# Patient Record
Sex: FEMALE | Race: OTHER | URBAN - METROPOLITAN AREA
[De-identification: names, ages, dates, MRNs, and addresses within clinical notes are randomized per-mention and may not be internally consistent; named-entity substitution may affect disease eponyms.]

---

## 2022-02-05 ENCOUNTER — OFFICE VISIT (OUTPATIENT)
Dept: INTERNAL MEDICINE CLINIC | Facility: CLINIC | Age: 42
End: 2022-02-05
Payer: COMMERCIAL

## 2022-02-05 VITALS
WEIGHT: 180 LBS | OXYGEN SATURATION: 99 % | BODY MASS INDEX: 30.73 KG/M2 | HEART RATE: 84 BPM | SYSTOLIC BLOOD PRESSURE: 120 MMHG | DIASTOLIC BLOOD PRESSURE: 78 MMHG | HEIGHT: 64 IN

## 2022-02-05 DIAGNOSIS — Z12.31 ENCOUNTER FOR SCREENING MAMMOGRAM FOR MALIGNANT NEOPLASM OF BREAST: ICD-10-CM

## 2022-02-05 DIAGNOSIS — N92.6 ABNORMAL MENSTRUAL CYCLE: ICD-10-CM

## 2022-02-05 DIAGNOSIS — Z13.220 SCREENING FOR LIPID DISORDERS: ICD-10-CM

## 2022-02-05 DIAGNOSIS — Z83.49 FAMILY HISTORY OF THYROID DISEASE: ICD-10-CM

## 2022-02-05 DIAGNOSIS — Z00.00 PREVENTATIVE HEALTH CARE: Primary | ICD-10-CM

## 2022-02-05 DIAGNOSIS — Z13.1 SCREENING FOR DIABETES MELLITUS: ICD-10-CM

## 2022-02-05 PROBLEM — N87.9 CERVICAL DYSPLASIA: Status: ACTIVE | Noted: 2020-07-15

## 2022-02-05 PROBLEM — O02.1 MISSED ABORTION: Status: ACTIVE | Noted: 2020-07-15

## 2022-02-05 PROBLEM — O02.1 MISSED ABORTION (HCC): Status: ACTIVE | Noted: 2020-07-15

## 2022-02-05 LAB
ALBUMIN SERPL-MCNC: 4 G/DL (ref 3.4–5)
ALBUMIN/GLOB SERPL: 0.9 {RATIO} (ref 1–2)
ALP LIVER SERPL-CCNC: 85 U/L
ALT SERPL-CCNC: 32 U/L
ANION GAP SERPL CALC-SCNC: 6 MMOL/L (ref 0–18)
AST SERPL-CCNC: 21 U/L (ref 15–37)
BILIRUB SERPL-MCNC: 0.6 MG/DL (ref 0.1–2)
BUN BLD-MCNC: 10 MG/DL (ref 7–18)
BUN/CREAT SERPL: 10.2 (ref 10–20)
CALCIUM BLD-MCNC: 9.1 MG/DL (ref 8.5–10.1)
CHLORIDE SERPL-SCNC: 105 MMOL/L (ref 98–112)
CHOLEST SERPL-MCNC: 217 MG/DL (ref ?–200)
CO2 SERPL-SCNC: 26 MMOL/L (ref 21–32)
CREAT BLD-MCNC: 0.98 MG/DL
EST. AVERAGE GLUCOSE BLD GHB EST-MCNC: 128 MG/DL (ref 68–126)
FASTING PATIENT LIPID ANSWER: YES
FASTING STATUS PATIENT QL REPORTED: YES
GLOBULIN PLAS-MCNC: 4.7 G/DL (ref 2.8–4.4)
GLUCOSE BLD-MCNC: 102 MG/DL (ref 70–99)
HBA1C MFR BLD: 6.1 % (ref ?–5.7)
HDLC SERPL-MCNC: 52 MG/DL (ref 40–59)
LDLC SERPL CALC-MCNC: 141 MG/DL (ref ?–100)
NONHDLC SERPL-MCNC: 165 MG/DL (ref ?–130)
OSMOLALITY SERPL CALC.SUM OF ELEC: 283 MOSM/KG (ref 275–295)
POTASSIUM SERPL-SCNC: 4.8 MMOL/L (ref 3.5–5.1)
PROT SERPL-MCNC: 8.7 G/DL (ref 6.4–8.2)
SODIUM SERPL-SCNC: 137 MMOL/L (ref 136–145)
TRIGL SERPL-MCNC: 134 MG/DL (ref 30–149)
TSI SER-ACNC: 1.36 MIU/ML (ref 0.36–3.74)
VLDLC SERPL CALC-MCNC: 25 MG/DL (ref 0–30)

## 2022-02-05 PROCEDURE — 99386 PREV VISIT NEW AGE 40-64: CPT | Performed by: FAMILY MEDICINE

## 2022-02-05 PROCEDURE — 3078F DIAST BP <80 MM HG: CPT | Performed by: FAMILY MEDICINE

## 2022-02-05 PROCEDURE — 84443 ASSAY THYROID STIM HORMONE: CPT | Performed by: FAMILY MEDICINE

## 2022-02-05 PROCEDURE — 3074F SYST BP LT 130 MM HG: CPT | Performed by: FAMILY MEDICINE

## 2022-02-05 PROCEDURE — 83036 HEMOGLOBIN GLYCOSYLATED A1C: CPT | Performed by: FAMILY MEDICINE

## 2022-02-05 PROCEDURE — 80053 COMPREHEN METABOLIC PANEL: CPT | Performed by: FAMILY MEDICINE

## 2022-02-05 PROCEDURE — 3008F BODY MASS INDEX DOCD: CPT | Performed by: FAMILY MEDICINE

## 2022-02-05 PROCEDURE — 80061 LIPID PANEL: CPT | Performed by: FAMILY MEDICINE

## 2022-11-21 ENCOUNTER — OFFICE VISIT (OUTPATIENT)
Dept: OBGYN CLINIC | Facility: CLINIC | Age: 42
End: 2022-11-21
Payer: COMMERCIAL

## 2022-11-21 VITALS
WEIGHT: 171.63 LBS | SYSTOLIC BLOOD PRESSURE: 117 MMHG | BODY MASS INDEX: 29 KG/M2 | DIASTOLIC BLOOD PRESSURE: 79 MMHG | HEART RATE: 84 BPM

## 2022-11-21 DIAGNOSIS — Z12.4 SCREENING FOR MALIGNANT NEOPLASM OF CERVIX: ICD-10-CM

## 2022-11-21 DIAGNOSIS — Z01.419 WELL WOMAN EXAM WITH ROUTINE GYNECOLOGICAL EXAM: Primary | ICD-10-CM

## 2022-11-21 DIAGNOSIS — N39.3 STRESS INCONTINENCE: ICD-10-CM

## 2022-11-21 PROCEDURE — 99386 PREV VISIT NEW AGE 40-64: CPT | Performed by: NURSE PRACTITIONER

## 2022-11-21 PROCEDURE — 3074F SYST BP LT 130 MM HG: CPT | Performed by: NURSE PRACTITIONER

## 2022-11-21 PROCEDURE — 3078F DIAST BP <80 MM HG: CPT | Performed by: NURSE PRACTITIONER

## 2022-11-22 LAB — HPV I/H RISK 1 DNA SPEC QL NAA+PROBE: NEGATIVE

## 2023-01-03 ENCOUNTER — HOSPITAL ENCOUNTER (OUTPATIENT)
Dept: MAMMOGRAPHY | Age: 43
Discharge: HOME | End: 2023-01-03
Attending: FAMILY MEDICINE
Payer: COMMERCIAL

## 2023-01-03 DIAGNOSIS — Z12.31 ENCOUNTER FOR SCREENING MAMMOGRAM FOR MALIGNANT NEOPLASM OF BREAST: ICD-10-CM

## 2023-01-03 PROCEDURE — 77063 BREAST TOMOSYNTHESIS BI: CPT | Performed by: FAMILY MEDICINE

## 2023-01-03 PROCEDURE — 77067 SCR MAMMO BI INCL CAD: CPT | Performed by: FAMILY MEDICINE

## 2023-12-04 ENCOUNTER — E-VISIT (OUTPATIENT)
Dept: TELEHEALTH | Age: 43
End: 2023-12-04
Payer: COMMERCIAL

## 2023-12-04 DIAGNOSIS — R05.1 ACUTE COUGH: Primary | ICD-10-CM

## 2023-12-04 PROCEDURE — 99422 OL DIG E/M SVC 11-20 MIN: CPT | Performed by: PHYSICIAN ASSISTANT

## 2023-12-05 RX ORDER — BENZONATATE 200 MG/1
200 CAPSULE ORAL 3 TIMES DAILY PRN
Qty: 30 CAPSULE | Refills: 0 | Status: SHIPPED | OUTPATIENT
Start: 2023-12-05

## 2023-12-05 RX ORDER — ALBUTEROL SULFATE 90 UG/1
2 AEROSOL, METERED RESPIRATORY (INHALATION) EVERY 6 HOURS
Qty: 1 EACH | Refills: 0 | Status: SHIPPED | OUTPATIENT
Start: 2023-12-05

## 2023-12-29 ENCOUNTER — OFFICE VISIT (OUTPATIENT)
Dept: OBGYN CLINIC | Facility: CLINIC | Age: 43
End: 2023-12-29
Payer: COMMERCIAL

## 2023-12-29 VITALS
SYSTOLIC BLOOD PRESSURE: 119 MMHG | HEART RATE: 80 BPM | DIASTOLIC BLOOD PRESSURE: 73 MMHG | BODY MASS INDEX: 30 KG/M2 | WEIGHT: 177.19 LBS

## 2023-12-29 DIAGNOSIS — Z12.31 SCREENING MAMMOGRAM FOR BREAST CANCER: ICD-10-CM

## 2023-12-29 DIAGNOSIS — Z01.419 WELL WOMAN EXAM WITH ROUTINE GYNECOLOGICAL EXAM: Primary | ICD-10-CM

## 2023-12-29 PROCEDURE — 3074F SYST BP LT 130 MM HG: CPT | Performed by: NURSE PRACTITIONER

## 2023-12-29 PROCEDURE — 3078F DIAST BP <80 MM HG: CPT | Performed by: NURSE PRACTITIONER

## 2023-12-29 PROCEDURE — 99396 PREV VISIT EST AGE 40-64: CPT | Performed by: NURSE PRACTITIONER

## 2024-03-09 ENCOUNTER — HOSPITAL ENCOUNTER (OUTPATIENT)
Dept: MAMMOGRAPHY | Age: 44
Discharge: HOME | End: 2024-03-09
Attending: NURSE PRACTITIONER
Payer: COMMERCIAL

## 2024-03-09 DIAGNOSIS — Z12.31 SCREENING MAMMOGRAM FOR BREAST CANCER: ICD-10-CM

## 2024-03-09 PROCEDURE — 77067 SCR MAMMO BI INCL CAD: CPT | Performed by: NURSE PRACTITIONER

## 2024-03-09 PROCEDURE — 77063 BREAST TOMOSYNTHESIS BI: CPT | Performed by: NURSE PRACTITIONER

## 2024-03-29 ENCOUNTER — TELEMEDICINE (OUTPATIENT)
Dept: FAMILY MEDICINE CLINIC | Facility: CLINIC | Age: 44
End: 2024-03-29
Payer: COMMERCIAL

## 2024-03-29 DIAGNOSIS — L70.0 ACNE VULGARIS: ICD-10-CM

## 2024-03-29 DIAGNOSIS — Z76.89 ENCOUNTER TO ESTABLISH CARE: Primary | ICD-10-CM

## 2024-03-29 DIAGNOSIS — L72.0 EPIDERMOID CYST: ICD-10-CM

## 2024-03-29 PROCEDURE — 99213 OFFICE O/P EST LOW 20 MIN: CPT

## 2024-03-29 NOTE — PROGRESS NOTES
Virtual Virtual Check-In    Kena Minaya verbally consents to a Virtual Video Check-In service on 24. Patient understands and accepts financial responsibility for any deductible, co-insurance and/or co-pays associated with this service. This visit is conducted using Telemedicine with live, interactive video and audio.     I spoke with Kena Minaya by secure video chat, verified date of birth, and discussed their current concerns:   Duration: 15 minutes     HPI  Kena Minaya presented via video visit as a new patient.  Complaint of cyst to the left jawline.  Nontender.  No drainage.  No fever.  Typically occurs each cycle.  Interested in treatment.  Has not tried any over-the-counter.  No acne treatment at this time.    Review of Systems:   Review of Systems   Constitutional:  Negative for activity change.   Respiratory:  Negative for chest tightness and shortness of breath.    Cardiovascular:  Negative for chest pain and palpitations.   Neurological: Negative.    Psychiatric/Behavioral: Negative.     All other systems reviewed and are negative.       Reviewed Active Problems:  Patient Active Problem List    Diagnosis    Cervical dysplasia     Formatting of this note might be different from the original.  Lucero Davenport A:when in 20\"s      Missed  (HCC)     Formatting of this note might be different from the original.  Lucero Davenport A:x 2   without anesthesia        Reviewed Past Medical History:  Past Medical History:   Diagnosis Date    Cancer (HCC)     cervical age 21    Gestational diabetes (HCC)       Reviewed Family History:  Family History   Problem Relation Age of Onset    Cancer Self         cervical age 21    Thyroid disease Mother         thyroid removed    Diabetes Father     Arthritis Father     Skin cancer Maternal Uncle        Reviewed Social History:  Social History     Socioeconomic History    Marital status:    Tobacco Use    Smoking status: Never    Smokeless tobacco:  Never   Substance and Sexual Activity    Alcohol use: Yes     Comment: 2 times a week    Drug use: Never    Sexual activity: Yes     Partners: Male   Social History Narrative    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       Reviewed Current Medications:  Current Outpatient Medications   Medication Sig Dispense Refill    benzonatate 200 MG Oral Cap Take 1 capsule (200 mg total) by mouth 3 (three) times daily as needed for cough. (Patient not taking: Reported on 12/29/2023) 30 capsule 0    albuterol 108 (90 Base) MCG/ACT Inhalation Aero Soln Inhale 2 puffs into the lungs every 6 (six) hours. (Patient not taking: Reported on 12/29/2023) 1 each 0          Physical Exam  There were no vitals filed for this visit.  Physical Exam  Vitals reviewed.   Constitutional:       Appearance: Normal appearance.   HENT:      Head: Normocephalic and atraumatic.   Cardiovascular:      Rate and Rhythm: Normal rate and regular rhythm.      Pulses: Normal pulses.      Heart sounds: Normal heart sounds.   Pulmonary:      Effort: Pulmonary effort is normal.      Breath sounds: Normal breath sounds.   Neurological:      General: No focal deficit present.      Mental Status: She is alert and oriented to person, place, and time.   Psychiatric:         Mood and Affect: Mood normal.         Behavior: Behavior normal.            Diagnosis:  (Z76.89) Encounter to establish care  (primary encounter diagnosis)  (L72.0) Epidermoid cyst  (L70.0) Acne vulgaris  Plan: Derm Referral - In Network       Patient with likely epidermoid cyst to the left jawline.  States increased in size.  Has not improved.  Typically flares during cycles.  Advise referral to dermatology for possible corticosteroid injection versus alternative treatment.  Referral placed.  Advise schedule.  Further management pending results.    Follow-up as needed.  Due for annual physical exam.  Will schedule.    Please note that the following visit was completed using  two-way, real-time interactive audio and/or video communication.  This has been done in good tanya to provide continuity of care in the best interest of the provider-patient relationship, due to the ongoing public health crisis/national emergency and because of restrictions of visitation.  There are limitations of this visit as no physical exam could be performed.  Every conscious effort was taken to allow for sufficient and adequate time.  This billing was spent on reviewing labs, medications, radiology tests and decision making.  Appropriate medical decision-making and tests are ordered as detailed in the plan of care above. Kena Minaya understands video evaluation is not a substitute for in person examination or emergency care. Patient advised to go to ER or call 911 for worsening symptoms or acute distress.     This note was prepared using Dragon Medical voice recognition dictation software. As a result errors may occur. When identified these errors have been corrected. While every attempt is made to correct errors during dictation discrepancies may still exist.  JAYMIE Owens

## 2024-04-08 ENCOUNTER — LAB ENCOUNTER (OUTPATIENT)
Dept: LAB | Age: 44
End: 2024-04-08
Payer: COMMERCIAL

## 2024-04-08 ENCOUNTER — OFFICE VISIT (OUTPATIENT)
Dept: FAMILY MEDICINE CLINIC | Facility: CLINIC | Age: 44
End: 2024-04-08
Payer: COMMERCIAL

## 2024-04-08 VITALS
TEMPERATURE: 98 F | BODY MASS INDEX: 31.24 KG/M2 | SYSTOLIC BLOOD PRESSURE: 110 MMHG | HEIGHT: 64 IN | WEIGHT: 183 LBS | HEART RATE: 66 BPM | OXYGEN SATURATION: 98 % | DIASTOLIC BLOOD PRESSURE: 64 MMHG

## 2024-04-08 DIAGNOSIS — Z00.00 WELL WOMAN EXAM WITHOUT GYNECOLOGICAL EXAM: Primary | ICD-10-CM

## 2024-04-08 DIAGNOSIS — L72.0 EPIDERMOID CYST: ICD-10-CM

## 2024-04-08 PROBLEM — O02.1 MISSED ABORTION (HCC): Status: RESOLVED | Noted: 2020-07-15 | Resolved: 2024-04-08

## 2024-04-08 PROBLEM — N87.9 CERVICAL DYSPLASIA: Status: RESOLVED | Noted: 2020-07-15 | Resolved: 2024-04-08

## 2024-04-08 LAB
ALBUMIN SERPL-MCNC: 4.5 G/DL (ref 3.2–4.8)
ALBUMIN/GLOB SERPL: 1.3 {RATIO} (ref 1–2)
ALP LIVER SERPL-CCNC: 87 U/L
ALT SERPL-CCNC: 21 U/L
ANION GAP SERPL CALC-SCNC: 8 MMOL/L (ref 0–18)
AST SERPL-CCNC: 22 U/L (ref ?–34)
BASOPHILS # BLD AUTO: 0.08 X10(3) UL (ref 0–0.2)
BASOPHILS NFR BLD AUTO: 0.6 %
BILIRUB SERPL-MCNC: 0.5 MG/DL (ref 0.3–1.2)
BUN BLD-MCNC: 12 MG/DL (ref 9–23)
BUN/CREAT SERPL: 14.3 (ref 10–20)
CALCIUM BLD-MCNC: 9.6 MG/DL (ref 8.7–10.4)
CHLORIDE SERPL-SCNC: 108 MMOL/L (ref 98–112)
CHOLEST SERPL-MCNC: 211 MG/DL (ref ?–200)
CO2 SERPL-SCNC: 24 MMOL/L (ref 21–32)
CREAT BLD-MCNC: 0.84 MG/DL
DEPRECATED RDW RBC AUTO: 48.3 FL (ref 35.1–46.3)
EGFRCR SERPLBLD CKD-EPI 2021: 89 ML/MIN/1.73M2 (ref 60–?)
EOSINOPHIL # BLD AUTO: 0.43 X10(3) UL (ref 0–0.7)
EOSINOPHIL NFR BLD AUTO: 3.2 %
ERYTHROCYTE [DISTWIDTH] IN BLOOD BY AUTOMATED COUNT: 14.7 % (ref 11–15)
EST. AVERAGE GLUCOSE BLD GHB EST-MCNC: 131 MG/DL (ref 68–126)
FASTING PATIENT LIPID ANSWER: NO
FASTING STATUS PATIENT QL REPORTED: NO
GLOBULIN PLAS-MCNC: 3.5 G/DL (ref 2.8–4.4)
GLUCOSE BLD-MCNC: 63 MG/DL (ref 70–99)
HBA1C MFR BLD: 6.2 % (ref ?–5.7)
HCT VFR BLD AUTO: 40.4 %
HDLC SERPL-MCNC: 51 MG/DL (ref 40–59)
HGB BLD-MCNC: 12.8 G/DL
IMM GRANULOCYTES # BLD AUTO: 0.04 X10(3) UL (ref 0–1)
IMM GRANULOCYTES NFR BLD: 0.3 %
LDLC SERPL CALC-MCNC: 138 MG/DL (ref ?–100)
LYMPHOCYTES # BLD AUTO: 3.42 X10(3) UL (ref 1–4)
LYMPHOCYTES NFR BLD AUTO: 25.3 %
MCH RBC QN AUTO: 28.6 PG (ref 26–34)
MCHC RBC AUTO-ENTMCNC: 31.7 G/DL (ref 31–37)
MCV RBC AUTO: 90.2 FL
MONOCYTES # BLD AUTO: 1.44 X10(3) UL (ref 0.1–1)
MONOCYTES NFR BLD AUTO: 10.6 %
NEUTROPHILS # BLD AUTO: 8.13 X10 (3) UL (ref 1.5–7.7)
NEUTROPHILS # BLD AUTO: 8.13 X10(3) UL (ref 1.5–7.7)
NEUTROPHILS NFR BLD AUTO: 60 %
NONHDLC SERPL-MCNC: 160 MG/DL (ref ?–130)
OSMOLALITY SERPL CALC.SUM OF ELEC: 288 MOSM/KG (ref 275–295)
PLATELET # BLD AUTO: 342 10(3)UL (ref 150–450)
POTASSIUM SERPL-SCNC: 4.1 MMOL/L (ref 3.5–5.1)
PROT SERPL-MCNC: 8 G/DL (ref 5.7–8.2)
RBC # BLD AUTO: 4.48 X10(6)UL
SODIUM SERPL-SCNC: 140 MMOL/L (ref 136–145)
TRIGL SERPL-MCNC: 121 MG/DL (ref 30–149)
TSI SER-ACNC: 0.92 MIU/ML (ref 0.55–4.78)
VLDLC SERPL CALC-MCNC: 22 MG/DL (ref 0–30)
WBC # BLD AUTO: 13.5 X10(3) UL (ref 4–11)

## 2024-04-08 PROCEDURE — 3074F SYST BP LT 130 MM HG: CPT

## 2024-04-08 PROCEDURE — 99396 PREV VISIT EST AGE 40-64: CPT

## 2024-04-08 PROCEDURE — 3008F BODY MASS INDEX DOCD: CPT

## 2024-04-08 PROCEDURE — 3078F DIAST BP <80 MM HG: CPT

## 2024-04-08 NOTE — PROGRESS NOTES
Kena Minaya is a 43 year old female.  Chief Complaint   Patient presents with    Physical     Here for yearly physical . Pt had mammogram 1 month ago.     Cyst     Pt states she has a \" pimple\" that she has had on left lower jaw for 3 weeks , was big in size and has gone down, usually would get them around her menses time.     HPI:   Kena Minaya presented to the clinic for annual physical examination. No acute concerns. No changes in family/personal history. Normal Sleep. Normal appetite. Balanced diet. Normal BM/Urination. Physically active, 1 year goal to be more physically active.  Sexually active.  3 children. LMP: 3/28/23, regular cycles. No daily medications.   Occupation: works at school.     No current outpatient medications on file.      Past Medical History:   Diagnosis Date    Cancer (HCC)     cervical age 21    Gestational diabetes (HCC)       Past Surgical History:   Procedure Laterality Date    LEEP      age 22    TUBAL LIGATION  2019    WISDOM TEETH REMOVED  10/2022      Social History:  Social History     Socioeconomic History    Marital status:    Tobacco Use    Smoking status: Never    Smokeless tobacco: Never   Vaping Use    Vaping Use: Never used   Substance and Sexual Activity    Alcohol use: Yes     Comment: 2 times a week    Drug use: Never    Sexual activity: Yes     Partners: Male   Social History Narrative    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       Family History   Problem Relation Age of Onset    Cancer Self         cervical age 21    Thyroid disease Mother         thyroid removed    Diabetes Father     Arthritis Father     Skin cancer Maternal Uncle       Allergies   Allergen Reactions    Penicillins DIARRHEA, DIZZINESS, HIVES, NAUSEA ONLY, NAUSEA AND VOMITING, PALPITATIONS, RASH, SHORTNESS OF BREATH, Tightness in Throat and WHEEZING        REVIEW OF SYSTEMS:   Review of Systems   Constitutional: Negative.  Negative for activity change.   HENT:  Negative.     Eyes: Negative.    Respiratory: Negative.  Negative for chest tightness and shortness of breath.    Cardiovascular: Negative.  Negative for chest pain.   Gastrointestinal:  Negative for abdominal distention, abdominal pain, constipation, diarrhea and nausea.   Genitourinary: Negative.  Negative for difficulty urinating, menstrual problem and vaginal discharge.   Musculoskeletal: Negative.    Skin: Negative.    Neurological: Negative.    Psychiatric/Behavioral: Negative.        Wt Readings from Last 5 Encounters:   04/08/24 183 lb (83 kg)   12/29/23 177 lb 3.2 oz (80.4 kg)   11/21/22 171 lb 9.6 oz (77.8 kg)   02/05/22 180 lb (81.6 kg)     Body mass index is 31.41 kg/m².      EXAM:   /64 (BP Location: Right arm, Patient Position: Sitting, Cuff Size: adult)   Pulse 66   Temp 98.1 °F (36.7 °C) (Temporal)   Ht 5' 4\" (1.626 m)   Wt 183 lb (83 kg)   LMP 03/20/2024 (Exact Date)   SpO2 98%   BMI 31.41 kg/m²   Physical Exam  Vitals reviewed.   Constitutional:       Appearance: Normal appearance.   HENT:      Head: Normocephalic and atraumatic.      Right Ear: Tympanic membrane normal.      Left Ear: Tympanic membrane normal.      Mouth/Throat:      Mouth: Mucous membranes are moist.      Pharynx: Oropharynx is clear.   Eyes:      Pupils: Pupils are equal, round, and reactive to light.   Cardiovascular:      Rate and Rhythm: Normal rate and regular rhythm.      Pulses: Normal pulses.      Heart sounds: Normal heart sounds.   Pulmonary:      Effort: Pulmonary effort is normal.      Breath sounds: Normal breath sounds.   Chest:   Breasts:     Right: Normal.      Left: Normal.   Abdominal:      General: Abdomen is flat. There is no distension.      Palpations: Abdomen is soft. There is no mass.      Tenderness: There is no abdominal tenderness. There is no guarding or rebound.      Hernia: No hernia is present.   Musculoskeletal:         General: Normal range of motion.      Cervical back: Normal range  of motion.   Lymphadenopathy:      Upper Body:      Right upper body: No axillary adenopathy.      Left upper body: No axillary adenopathy.   Skin:     General: Skin is warm.   Neurological:      General: No focal deficit present.      Mental Status: She is alert and oriented to person, place, and time.   Psychiatric:         Mood and Affect: Mood normal.         Behavior: Behavior normal.            ASSESSMENT AND PLAN:   (Z00.00) Well woman exam without gynecological exam  (primary encounter diagnosis)  Plan: CBC W Differential W Platelet [E], Comp         Metabolic Panel (14) [E], TSH W Reflex To Free         T4 [E], Lipid Panel [E], Hemoglobin A1C [E],         CBC W Differential W Platelet [E], Comp         Metabolic Panel (14) [E], TSH W Reflex To Free         T4 [E], Lipid Panel [E], Hemoglobin A1C [E]  - Immunizations UTD   - tobacco, alcohol, illicit drug use discouraged  - safe sexual practices advised  - Reinforced healthy diet, lifestyle, and exercise.  - Past Medical/Social/Family histories reviewed  - Regular dental visits recommended   - Regular eye exams recommended     Health Maintenance   Topic Date Due    Pap Smear  11/21/2025     Health Maintenance   Topic Date Due    Mammogram  03/09/2025       (Z68.31) BMI 31.0-31.9,adult  Plan: Hemoglobin A1C [E], Hemoglobin A1C [E]        Advised heart healthy diet. Advised regular activity. Goal 150 minutes of moderate activity per week. Advised to pay attention to clothes fit and \"how she feels\" rather than scale number. Continue 1 year goal of physical health.    (L72.0) Epidermoid cyst  Plan: advised follow up with derm. Referral placed. Advised to schedule.           Follow up in 1 year or sooner if needed      The patient indicates understanding of these issues and agrees to the plan.  Chaperone offered to the patient prior to examination    This note was prepared using Dragon Medical voice recognition dictation software. As a result errors may occur.  When identified these errors have been corrected. While every attempt is made to correct errors during dictation discrepancies may still exist.

## 2024-04-12 ENCOUNTER — PATIENT MESSAGE (OUTPATIENT)
Dept: FAMILY MEDICINE CLINIC | Facility: CLINIC | Age: 44
End: 2024-04-12

## 2024-04-12 NOTE — TELEPHONE ENCOUNTER
From: Kena Minaya  To: Libia Blanco  Sent: 4/12/2024 8:58 AM CDT  Subject: Blood work    So sorry to bother you. I just wanted to ask a question in regards to My blood work. I unfortunately of course things I don't understand [employer] to see what they say and it was my RDW-SD which says I have a very high white blood cell count so I didn't know if that's something I should worry about.

## 2024-11-06 NOTE — PROGRESS NOTES
Patient scheduled 11/26/24 with Dr. Park    Precilla Courser is a 44year old female who initiated e-visit care today. HPI:   See answers to questionnaire submission     No current outpatient medications on file. Past Medical History:   Diagnosis Date    Cancer [facility])     Gestational diabetes       Past Surgical History:   Procedure Laterality Date    LEEP      age 25    TUBAL LIGATION  2019    [address] REMOVED  10/2022      Family History   Problem Relation Age of Onset    Diabetes Father     Arthritis Father     Thyroid disease Mother         thyroid removed    Skin cancer Maternal Uncle       Social History:  Social History     Socioeconomic History    Marital status:    Tobacco Use    Smoking status: Never    Smokeless tobacco: Never   Substance and Sexual Activity    Alcohol use: Yes     Comment: 2 times a week    Drug use: Never    Sexual activity: Yes     Partners: Male   Social History Narrative    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 ASSESSMENT AND PLAN:       Diagnoses and all orders for this visit:    Acute cough  -     benzonatate 200 MG Oral Cap; Take 1 capsule (200 mg total) by mouth 3 (three) times daily as needed for cough. -     albuterol 108 (90 Base) MCG/ACT Inhalation Aero Soln; Inhale 2 puffs into the lungs every 6 (six) hours. Duration of  the service:  16 minutes      See [employer] message exchange and Patient Instructions for Comfort Care and patient education.

## 2025-03-28 ENCOUNTER — OFFICE VISIT (OUTPATIENT)
Dept: OBGYN CLINIC | Facility: CLINIC | Age: 45
End: 2025-03-28
Payer: COMMERCIAL

## 2025-03-28 VITALS
HEART RATE: 76 BPM | BODY MASS INDEX: 30 KG/M2 | WEIGHT: 175.63 LBS | SYSTOLIC BLOOD PRESSURE: 100 MMHG | DIASTOLIC BLOOD PRESSURE: 61 MMHG

## 2025-03-28 DIAGNOSIS — Z01.419 WELL WOMAN EXAM WITH ROUTINE GYNECOLOGICAL EXAM: Primary | ICD-10-CM

## 2025-03-28 DIAGNOSIS — Z12.4 SCREENING FOR CERVICAL CANCER: ICD-10-CM

## 2025-03-28 DIAGNOSIS — Z12.31 SCREENING MAMMOGRAM FOR BREAST CANCER: ICD-10-CM

## 2025-03-28 PROCEDURE — 3074F SYST BP LT 130 MM HG: CPT | Performed by: NURSE PRACTITIONER

## 2025-03-28 PROCEDURE — 99396 PREV VISIT EST AGE 40-64: CPT | Performed by: NURSE PRACTITIONER

## 2025-03-28 PROCEDURE — 3078F DIAST BP <80 MM HG: CPT | Performed by: NURSE PRACTITIONER

## 2025-03-28 NOTE — PROGRESS NOTES
The following individual(s) verbally consented to be recorded using ambient AI listening technology and understand that they can each withdraw their consent to this listening technology at any point by asking the clinician to turn off or pause the recording:    Patient name: Kena Minaya  Additional names:

## 2025-03-28 NOTE — PROGRESS NOTES
[facility]    Obstetrics and Gynecology    Chief Complaint   Patient presents with    Gyn Exam     ANNUAL EXAM        Kena Minaya is a 44 year old female  Patient's last menstrual period was 03/10/2025 (approximate). presenting for annual gynecology exam.  History of Present Illness  See last 2023  Over the past year, she has experienced changes in her menstrual cycle. Initially, her cycles occurred every two weeks for a few months. Recently, they have returned to a monthly pattern, lasting about three days with a lighter to medium flow, but she experiences more intense cramps. She missed a cycle the month before last but had a  normal cycle last month. No pain or bleeding during intimacy, abnormal vaginal discharge, or odor.    She has become more physically active, participating in [employer] two to three times a week, and works as a recess coordinator, spending three to four hours a day outside exercising and running with children. She notes that her increased activity may have contributed to changes in her body composition, as she had to buy smaller clothing sizes despite the scale showing the same weight. She occasionally gets bruises on her breasts from [employer] but attributes them to the activity.    There is a family history of lung cancer on her mother's side, with a cousin who smokes heavily.    No concerns with intercourse, she is sexually active with her .    no urinary or bowel concerns.     Pap: NILM, neg HPV  2019 normal per patient  Normal pap since leep  Hx of leep age 22  Contraception:tubal ligation - bilateral salpingectomy in 2019  Mammo:  3/2024 normal  Colonoscopy: never      OBSTETRICS HISTORY:  OB History    Para Term  AB Living   6 3 1 2 3 3   SAB IAB Ectopic Multiple Live Births   3 0 0 0 3       GYNE HISTORY:  Menarche: 17 (3/28/2025  8:06 AM)  Period Cycle (Days): Monthly (3/28/2025  8:06 AM)  Period Duration (Days): 3-5 days (3/28/2025  8:06  AM)  Period Flow: MODARTE (3/28/2025  8:06 AM)  Use of Birth Control (if yes, specify type): Tubal Ligation (3/28/2025  8:06 AM)  Pap Date: 11/21/22 (3/28/2025  8:06 AM)  Pap Result Notes: PAP NEG HPV NEG// LAST MAMMO 3/9/24 DIAG C 1 NEG (3/28/2025  8:06 AM)  Follow Up Recommendation: LAST ANNUAL 12/29/23 EMB (3/28/2025  8:06 AM)      History   Sexual Activity    Sexual activity: Yes    Partners: Male           Latest Ref Rng & Units 11/21/2022     3:14 PM   RECENT PAP RESULTS   INTERPRETATION/RESULT: Negative for intraepithelial lesion or malignancy Negative for intraepithelial lesion or malignancy    HPV Negative Negative          MEDICAL HISTORY:  Past Medical History:    Cancer (HCC)    cervical age 21    Gestational diabetes (HCC)     Past Surgical History:   Procedure Laterality Date    Leep      age 22    Tubal ligation  2019    [place] teeth removed  10/2022       SOCIAL HISTORY:  Social History     Socioeconomic History    Marital status:      Spouse name: Not on file    Number of children: Not on file    Years of education: Not on file    Highest education level: Not on file   Occupational History    Not on file   Tobacco Use    Smoking status: Never    Smokeless tobacco: Never   Vaping Use    Vaping status: Never Used   Substance and Sexual Activity    Alcohol use: Yes     Comment: 2 times a week    Drug use: Never    Sexual activity: Yes     Partners: Male   Other Topics Concern    Caffeine Concern Not Asked    Exercise Not Asked    Seat Belt Not Asked    Special Diet Not Asked    Stress Concern Not Asked    Weight Concern Not Asked   Social History Narrative    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      Social Drivers of Health     Food Insecurity: Not on file   Transportation Needs: Not on file   Stress: Not on file   Housing Stability: Not on file         Depression Screening (PHQ-2/PHQ-9): Over the LAST 2 WEEKS   Little interest or pleasure in doing things (over the last two  weeks)?: Not at all    Feeling down, depressed, or hopeless (over the last two weeks)?: Not at all    PHQ-2 SCORE: 0           FAMILY HISTORY:  Family History   Problem Relation Age of Onset    Cancer Self         cervical age 21    Thyroid disease Mother         thyroid removed    Diabetes Father     Arthritis Father     Skin cancer Maternal Uncle        MEDICATIONS:  No current outpatient medications on file.    ALLERGIES:  Allergies[1]      Review of Systems:  Constitutional:  Denies fatigue, night sweats, hot flashes  Eyes:  denies blurred or double vision  Cardiovascular:  denies chest pain or palpitations  Respiratory:  denies shortness of breath  Gastrointestinal:  denies heartburn, abdominal pain, diarrhea or constipation  Genitourinary:  denies dysuria, incontinence, abnormal vaginal discharge, vaginal itching,   Musculoskeletal:  denies back pain   Skin/Breast:  Denies any breast pain, lumps, or discharge.   Neurological:  denies headaches, extremity weakness or numbness.  Psychiatric: denies depression or anxiety.  Endocrine:   denies excessive thirst or urination.  Heme/Lymph:  denies history of anemia, easy bruising or bleeding.      PHYSICAL EXAM:     Vitals:    03/28/25 0807   BP: 100/61   Pulse: 76   Weight: 175 lb 9.6 oz (79.7 kg)       Body mass index is 30.14 kg/m².     Patient offered chaperone, patient     Constitutional: well developed, well nourished  Psychiatric:  Oriented to time, place, person and situation. Appropriate mood and affect  Head/Face: normocephalic  Neck/Thyroid: thyroid symmetric, no thyromegaly, no nodules, no adenopathy  Lymphatic:no abnormal supraclavicular or axillary adenopathy is noted  Breast: normal without palpable masses, tenderness, asymmetry, nipple discharge, nipple retraction or skin changes  Abdomen:  soft, nontender, nondistended, no masses  Skin/Hair: no unusual rashes or bruises  Extremities: no edema, no cyanosis    Pelvic Exam:  External Genitalia: normal  appearance, hair distribution, and no lesions  Urethral Meatus:  normal in size, location, without lesions and prolapse  Bladder:  No fullness, masses or tenderness  Vagina:  Normal appearance without lesions, no abnormal discharge  Cervix:  Normal without tenderness on motion  Uterus: normal in size, contour, position, mobility, without tenderness  Adnexa: normal without masses or tenderness  Perineum: normal  Anus: no hemorroids     Assessment & Plan:    ICD-10-CM    1. Well woman exam with routine gynecological exam  Z01.419       2. Screening for cervical cancer  Z12.4 ThinPrep PAP Smear     Hpv Dna  High Risk , Thin Prep Collect     Hpv Dna  High Risk , Thin Prep Collect     ThinPrep PAP Smear      3. Screening mammogram for breast cancer  Z12.31 ZEFERINO HELEN 2D+3D SCREENING BILAT (CPT=77067/97388)           Assessment & Plan  Menstrual irregularities  Menstrual cycle irregularities noted, possibly influenced by increased physical activity.  - Monitor for frequent or heavy bleeding- call if returns or any abnormal uterine bleeding     Well woman exam  Routine exam completed. Family history updated. Active lifestyle and balanced diet noted.    - Perform Pap smear.  - Order screening mammogram.    LEEP  History of LEEP at age 22 with normal subsequent Pap smears.  - Perform Pap smear with cytology and HPV testing.    Tubal ligation - bilateral salpingectomy  No concerns post-tubal ligation with bilateral salpingectomy.    Follow-up  Routine follow-up screenings and tests due.  - Order screening mammogram.  - Schedule follow-up for Pap smear results.  - Blood work scheduled with primary care on April 14th.    Discussed diet, exercise, MVIs with Ca/Vit D  Follow up in 1 yr for WWE    JAYMIE León      This note was prepared using Dragon Medical voice recognition dictation software. As a result errors may occur. When identified these errors have been corrected. While every attempt is made to correct errors  during dictation discrepancies may still exist.         [1]   Allergies  Allergen Reactions    Penicillins DIARRHEA, DIZZINESS, HIVES, NAUSEA ONLY, NAUSEA AND VOMITING, PALPITATIONS, RASH, SHORTNESS OF BREATH, Tightness in Throat and WHEEZING

## 2025-03-31 LAB — HPV E6+E7 MRNA CVX QL NAA+PROBE: NEGATIVE

## 2025-04-30 ENCOUNTER — TELEPHONE (OUTPATIENT)
Dept: FAMILY MEDICINE CLINIC | Facility: CLINIC | Age: 45
End: 2025-04-30

## 2025-04-30 DIAGNOSIS — Z12.83 SKIN EXAM, SCREENING FOR CANCER: Primary | ICD-10-CM

## 2025-05-01 NOTE — TELEPHONE ENCOUNTER
There is an order in the chart pending for Libia Blanco to sign for Dr. Kirk Rodriguez.   Thank you

## 2025-05-02 NOTE — TELEPHONE ENCOUNTER
Spoke to patient (verified Name and ) and relayed provider's message below. Patient verbalized understanding. States she was unable to make it to the last appointment and had to reschedule, will be seen for physical on . Dermatology referral contact information given. No further questions or concerns at this time.

## 2025-05-05 ENCOUNTER — OFFICE VISIT (OUTPATIENT)
Dept: FAMILY MEDICINE CLINIC | Facility: CLINIC | Age: 45
End: 2025-05-05
Payer: COMMERCIAL

## 2025-05-05 VITALS
OXYGEN SATURATION: 97 % | HEART RATE: 79 BPM | RESPIRATION RATE: 16 BRPM | BODY MASS INDEX: 30.08 KG/M2 | DIASTOLIC BLOOD PRESSURE: 60 MMHG | HEIGHT: 64 IN | SYSTOLIC BLOOD PRESSURE: 102 MMHG | TEMPERATURE: 98 F | WEIGHT: 176.19 LBS

## 2025-05-05 DIAGNOSIS — E66.811 CLASS 1 OBESITY WITHOUT SERIOUS COMORBIDITY WITH BODY MASS INDEX (BMI) OF 30.0 TO 30.9 IN ADULT, UNSPECIFIED OBESITY TYPE: ICD-10-CM

## 2025-05-05 DIAGNOSIS — D22.9 MULTIPLE ATYPICAL NEVI: ICD-10-CM

## 2025-05-05 DIAGNOSIS — R06.02 SHORTNESS OF BREATH: ICD-10-CM

## 2025-05-05 DIAGNOSIS — Z00.00 ROUTINE PHYSICAL EXAMINATION: ICD-10-CM

## 2025-05-05 DIAGNOSIS — Z12.83 SKIN CANCER SCREENING: Primary | ICD-10-CM

## 2025-05-05 PROCEDURE — 3074F SYST BP LT 130 MM HG: CPT

## 2025-05-05 PROCEDURE — 99396 PREV VISIT EST AGE 40-64: CPT

## 2025-05-05 PROCEDURE — 3078F DIAST BP <80 MM HG: CPT

## 2025-05-05 PROCEDURE — 99213 OFFICE O/P EST LOW 20 MIN: CPT

## 2025-05-05 PROCEDURE — 3008F BODY MASS INDEX DOCD: CPT

## 2025-05-05 NOTE — PROGRESS NOTES
HPI:    Patient ID: Kena Minaya is a 44 year old female.    HPI  Chief Complaint   Patient presents with    Physical     Patient here in office for physical.  Last Pap completed on 3/28/2025 and cytology was normal, HPV negative.  Last Pap completed in 2024 and was normal.  Reports history of normal menstrual cycles.  Denies dysmenorrhea or abnormal vaginal bleeding.    Complains of intermittent shortness of breath, mostly after running (resolves quickly after).  Involved in [employer], has no difficulty breathing at that time.    Requesting referral for dermatology for atypical nevi and skin check        Review of Systems   Constitutional: Negative.  Negative for fever.   HENT: Negative.  Negative for ear pain and sore throat.    Eyes: Negative.  Negative for visual disturbance.   Respiratory: Negative.  Negative for cough and shortness of breath.    Cardiovascular: Negative.  Negative for chest pain and palpitations.   Gastrointestinal: Negative.  Negative for abdominal pain, blood in stool, constipation and diarrhea.   Genitourinary: Negative.  Negative for dysuria, frequency, hematuria and menstrual problem.        Leakign of urine    Musculoskeletal: Negative.  Negative for arthralgias and myalgias.   Skin: Negative.  Negative for rash.   Neurological: Negative.  Negative for dizziness, facial asymmetry and headaches.   Psychiatric/Behavioral: Negative.         /60   Pulse 79   Temp 97.6 °F (36.4 °C) (Temporal)   Resp 16   Ht 5' 4\" (1.626 m)   Wt 176 lb 3.2 oz (79.9 kg)   LMP 04/17/2025 (Exact Date)   SpO2 97%   BMI 30.24 kg/m²     Past Medical History[1]  Past Surgical History[2]  Social History     Socioeconomic History    Marital status:      Spouse name: Not on file    Number of children: Not on file    Years of education: Not on file    Highest education level: Not on file   Occupational History    Not on file   Tobacco Use    Smoking status: Never    Smokeless tobacco: Never   Vaping  Use    Vaping status: Never Used   Substance and Sexual Activity    Alcohol use: Yes     Alcohol/week: 3.0 standard drinks of alcohol     Types: 3 Glasses of wine per week     Comment: 2 times a week    Drug use: Never    Sexual activity: Yes     Partners: Male   Other Topics Concern    Caffeine Concern Not Asked    Exercise Not Asked    Seat Belt Not Asked    Special Diet Not Asked    Stress Concern Not Asked    Weight Concern Not Asked   Social History Narrative    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      Social Drivers of Health     Food Insecurity: No Food Insecurity (5/5/2025)    NCSS - Food Insecurity     Worried About Running Out of Food in the Last Year: No     Ran Out of Food in the Last Year: No   Transportation Needs: No Transportation Needs (5/5/2025)    NCSS - Transportation     Lack of Transportation: No   Stress: Not on file   Housing Stability: Not At Risk (5/5/2025)    NCSS - Housing/Utilities     Has Housing: Yes     Worried About Losing Housing: No     Unable to Get Utilities: No     Family History[3]    Immunization History   Administered Date(s) Administered    Covid-19 Vaccine Pfizer 30 mcg/0.3 ml 04/28/2021, 05/19/2021, 01/10/2022    Influenza 11/16/2011, 10/09/2018, 09/01/2019    TDAP 11/16/2011, 10/23/2018       Health Maintenance   Topic Date Due    COVID-19 Vaccine (4 - 2024-25 season) 09/01/2024    Mammogram  03/09/2025    Annual Physical  04/08/2025    Influenza Vaccine (Season Ended) 10/01/2025    Pap Smear  03/28/2028    DTaP,Tdap,and Td Vaccines (3 - Td or Tdap) 10/23/2028    Annual Depression Screening  Completed    Pneumococcal Vaccine: Birth to 50yrs  Aged Out    Meningococcal B Vaccine  Aged Out        Current Medications[4]  Allergies:Allergies[5]   PHYSICAL EXAM:     Chief Complaint   Patient presents with    Physical      Physical Exam  Constitutional:       General: She is not in acute distress.     Appearance: Normal appearance. She is well-developed. She is  not ill-appearing.   HENT:      Head: Normocephalic and atraumatic.      Right Ear: Tympanic membrane, ear canal and external ear normal. There is no impacted cerumen.      Left Ear: Tympanic membrane and external ear normal. There is no impacted cerumen.      Nose: Nose normal. No congestion.      Mouth/Throat:      Mouth: Mucous membranes are dry.      Pharynx: No oropharyngeal exudate or posterior oropharyngeal erythema.   Eyes:      Conjunctiva/sclera: Conjunctivae normal.      Pupils: Pupils are equal, round, and reactive to light.   Cardiovascular:      Rate and Rhythm: Normal rate and regular rhythm.      Heart sounds: Normal heart sounds.   Pulmonary:      Effort: Pulmonary effort is normal. No respiratory distress.      Breath sounds: Normal breath sounds. No stridor. No wheezing, rhonchi or rales.   Chest:      Chest wall: No tenderness.   Abdominal:      General: Bowel sounds are normal. There is no distension.      Palpations: Abdomen is soft. There is no mass.      Tenderness: There is no abdominal tenderness. There is no right CVA tenderness, left CVA tenderness, guarding or rebound.      Hernia: No hernia is present.   Genitourinary:     Vagina: Normal.   Musculoskeletal:         General: Normal range of motion.      Cervical back: Normal range of motion and neck supple.   Lymphadenopathy:      Cervical: No cervical adenopathy.   Skin:     General: Skin is warm and dry.      Findings: No rash.      Comments: Diffuse atypical nevi    Neurological:      General: No focal deficit present.      Mental Status: She is alert and oriented to person, place, and time.      Deep Tendon Reflexes: Reflexes are normal and symmetric.   Psychiatric:         Behavior: Behavior normal.         Thought Content: Thought content normal.         Judgment: Judgment normal.                ASSESSMENT/PLAN:     Return yearly for physicals  Follow up with dentist every 6 months  Follow up with eye doctor yearly  Recommend  aerobic exercise for at least 30mins 5 days a week  Yearly flu shot  Tetanus booster every 10 years (Tdap/ Td)  Labs ordered/ or reviewed if done prior to appointment     Encounter Diagnoses   Name Primary?    Skin cancer screening Yes    Multiple atypical nevi     Routine physical examination     Class 1 obesity without serious comorbidity with body mass index (BMI) of 30.0 to 30.9 in adult, unspecified obesity type     Shortness of breath        1. Skin cancer screening  - Derm Referral - In Network    2. Multiple atypical nevi  - Derm Referral - In Network    3. Routine physical examination  - CBC With Differential With Platelet; Future  - Comp Metabolic Panel (14); Future  - Lipid Panel; Future  - TSH W Reflex To Free T4 [E]; Future    4. Class 1 obesity without serious comorbidity with body mass index (BMI) of 30.0 to 30.9 in adult, unspecified obesity type  - Hemoglobin A1C [E]; Future    5. Shortness of breath  - Return to office if breathing worsening during/after physical activity      Orders Placed This Encounter   Procedures    CBC With Differential With Platelet    Comp Metabolic Panel (14)    Lipid Panel    TSH W Reflex To Free T4 [E]    Hemoglobin A1C [E]       The above note was creating using Dragon speech recognition technology. Please excuse any typos    Meds This Visit:  Requested Prescriptions      No prescriptions requested or ordered in this encounter       Imaging & Referrals:  [facility] - INTERNAL         JAYMIE Hillman         [1]   Past Medical History:   Cancer (HCC)    cervical age 21    Gestational diabetes (HCC)    Headache   [2]   Past Surgical History:  Procedure Laterality Date    Leep      age 22          Tubal ligation  2019    [place] teeth removed  10/2022   [3]   Family History  Problem Relation Age of Onset    Cancer Self         cervical age 21    Thyroid disease Mother         thyroid removed    Diabetes Father     Arthritis Father     Skin cancer Maternal Uncle    [4]    No current outpatient medications on file.   [5]   Allergies  Allergen Reactions    Penicillins DIARRHEA, DIZZINESS, HIVES, NAUSEA ONLY, NAUSEA AND VOMITING, PALPITATIONS, RASH, SHORTNESS OF BREATH, Tightness in Throat and WHEEZING

## 2025-07-03 ENCOUNTER — HOSPITAL ENCOUNTER (OUTPATIENT)
Dept: MAMMOGRAPHY | Facility: HOSPITAL | Age: 45
Discharge: HOME | End: 2025-07-03
Attending: NURSE PRACTITIONER
Payer: COMMERCIAL

## 2025-07-03 DIAGNOSIS — Z12.31 SCREENING MAMMOGRAM FOR BREAST CANCER: ICD-10-CM

## 2025-07-03 PROCEDURE — 77063 BREAST TOMOSYNTHESIS BI: CPT | Performed by: NURSE PRACTITIONER

## 2025-07-03 PROCEDURE — 77067 SCR MAMMO BI INCL CAD: CPT | Performed by: NURSE PRACTITIONER

## 2025-07-07 ENCOUNTER — OFFICE VISIT (OUTPATIENT)
Dept: DERMATOLOGY CLINIC | Facility: CLINIC | Age: 45
End: 2025-07-07
Payer: COMMERCIAL

## 2025-07-07 DIAGNOSIS — L81.2 EPHELIDES: ICD-10-CM

## 2025-07-07 DIAGNOSIS — Z12.83 SKIN CANCER SCREENING: ICD-10-CM

## 2025-07-07 DIAGNOSIS — D18.01 CHERRY HEMANGIOMA: ICD-10-CM

## 2025-07-07 DIAGNOSIS — L81.4 LENTIGO: ICD-10-CM

## 2025-07-07 DIAGNOSIS — D23.9 BENIGN NEOPLASM OF SKIN, UNSPECIFIED LOCATION: ICD-10-CM

## 2025-07-07 DIAGNOSIS — D22.9 MULTIPLE NEVI: Primary | ICD-10-CM

## 2025-07-07 PROCEDURE — 99203 OFFICE O/P NEW LOW 30 MIN: CPT | Performed by: DERMATOLOGY

## 2025-07-07 NOTE — PROGRESS NOTES
The following individual(s) verbally consented to be recorded using ambient AI listening technology and understand that they can each withdraw their consent to this listening technology at any point by asking the clinician to turn off or pause the recording:    Patient name: Kena Minaya  Additional names:     Jeannette 45 Transitions Follow Up Call    10/13/2020    Patient: Toñito Christianson  Patient : 1949   MRN: <[id number]>  Reason for Admission:   Discharge Date: 20 RARS: Readmission Risk Score: 16         Spoke with: Patient's significant other, Ham Coronado. Care Transitions Subsequent and Final Call    Subsequent and Final Calls  Do you have any ongoing symptoms?:  Yes  -Patient continues to experience anxiety and short term memory loss. -numbness/tingling in fingertips since hospitalization; PCP aware  Do you have any questions related to your medications?:  No  Are you currently active with any services?:  no  Do you have any needs or concerns that I can assist you with?:  No  Identified Barriers:  Impairment  Care Transitions Interventions  No Identified Needs    Moody reports patient has \"good days and bad days\". -reports patient's cognitive abilities fluctuate   -patient becomes fatigued with activity   -ambulates without the use of an assistive device   -reports patient is independent with ADL's   -eating and drinking without any difficulty; elimination patterns normal   -reports patient is taking prescribed medications as ordered  -follow with a Neurologist in [place]   -has appointment in mid November with Neuro Psych Dept in [place]     Emotional support provided. Discussed will continue to follow.        Follow Up  Future Appointments   Date Time Provider Mendy Graham   2020 11:40 AM Jamee Mcnair MD [address] [place] [address] Shelly Garza RN

## 2025-07-14 NOTE — PROGRESS NOTES
Kena Minaya is a 44 year old female.    CC:    Chief Complaint   Patient presents with    Full Skin Exam     \"New Patient\" presents with referral for Skin cancer screening and   Multiple atypical nevi. Denies personal or family Hx of skin cancer.  Denies any concerns.           HISTORY:    Past Medical History[1]   Past Surgical History[2]   Family History[3]   Short Social Hx on File[4]     Current Medications[5]  Allergies:   Allergies[6]    Past Medical History[7]  Past Surgical History[8]  Social History     Socioeconomic History    Marital status:      Spouse name: Not on file    Number of children: Not on file    Years of education: Not on file    Highest education level: Not on file   Occupational History    Not on file   Tobacco Use    Smoking status: Never    Smokeless tobacco: Never   Vaping Use    Vaping status: Never Used   Substance and Sexual Activity    Alcohol use: Yes     Alcohol/week: 3.0 standard drinks of alcohol     Types: 3 Glasses of wine per week     Comment: 2 times a week    Drug use: Never    Sexual activity: Yes     Partners: Male   Other Topics Concern    Caffeine Concern Not Asked    Exercise Not Asked    Seat Belt Not Asked    Special Diet Not Asked    Stress Concern Not Asked    Weight Concern Not Asked    Grew up on a farm Not Asked    History of tanning Yes    Outdoor occupation Not Asked    Breast feeding Not Asked    Reaction to local anesthetic No    Pt has a pacemaker No    Pt has a defibrillator No   Social History Narrative    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      Social Drivers of Health     Food Insecurity: No Food Insecurity (5/5/2025)    NCSS - Food Insecurity     Worried About Running Out of Food in the Last Year: No     Ran Out of Food in the Last Year: No   Transportation Needs: No Transportation Needs (5/5/2025)    NCSS - Transportation     Lack of Transportation: No   Stress: Not on file   Housing Stability: Not At Risk (5/5/2025)     NCSS - Housing/Utilities     Has Housing: Yes     Worried About Losing Housing: No     Unable to Get Utilities: No     Family History[9]    HPI:     HPI:  Chief Complaint   Patient presents with    Full Skin Exam     \"New Patient\" presents with referral for Skin cancer screening and   Multiple atypical nevi. Denies personal or family Hx of skin cancer.  Denies any concerns.         History of Present Illness  Kena Minaya is a 44 year old female who presents for a dermatology consultation regarding skin changes associated with pregnancy.    She developed a mole on her back during her pregnancy 32 years ago, attributed to hormonal changes, which has remained unchanged in size and appearance since its initial development.    She has several freckles and cherry angiomas on her back, which also appeared during her pregnancy and have remained unchanged for 13 years.    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    She experiences an increase in freckles during the summer months, particularly on her face, shoulders, and chest. Recently, she has been more diligent with sunscreen application, resulting in fewer freckles than usual.    She wants to be more proactive about her skin health, influenced by her friends' experiences with skin issues. No recent changes in her skin lesions, including moles and freckles, and no new or concerning symptoms related to her skin.    Patient presents with concerns above.    Patient has been in their usual state of health.     Past notes/ records and appropriate/relevant lab results including pathology and past body maps reviewed. Including outside notes/ PCP notes as appropriate. Updated and new information noted in current visit.     ROS:  Denies other relevant systemic  complaints. See HPI.     History, medications, allergies reviewed as noted.    Allergies:  Penicillins      There were no vitals filed for this visit.    Physical Examination:     Well-developed well-nourished patient alert oriented in no acute distress.  Exam performed of appropriate involved areas    Physical Exam  SKIN: Freckles and cherry angiomas on back. Sun exposure on chest. Freckles on face. Moles on breasts. Cherry angiomas present. Skin tags present. Skin normal.    Multiple light to medium brown, well marginated, uniformly pigmented, macules and papules 6 mm and less scattered on exam. pigmented lesions examined with dermoscopy benign-appearing patterns.     Waxy tannish keratotic papules scattered, cherry-red vascular papules scattered.    See map today's date for lesions noted .  See assessment and plan below for specific lesions.    Otherwise remarkable for lesions as noted on map.    See A/P  below for additional information:    Assessment / plan:    Results  DIAGNOSTIC  Skin Examination: Multiple freckles, cherry angiomas, and acrochordons. No concerning nevi or lesions. (07/07/2025)    No orders of the defined types were placed in this encounter.      Meds & Refills for this Visit:  Requested Prescriptions      No prescriptions requested or ordered in this encounter         Encounter Diagnoses   Name Primary?    Multiple nevi Yes    Benign neoplasm of skin, unspecified location     Ephelides     Lentigo     Skin cancer screening     Cherry hemangioma        Assessment & Plan  Moles and sun exposure  Moles on the chest and breasts are flat and unchanged in appearance. Increased freckling on the face and shoulders due to sun exposure. She is proactive with sunscreen use but needs to apply it to ears and reapply during prolonged exposure.  - Advise regular use of sunscreen on the face, chest, and ears.  - Recommend wearing sun-protective clothing and hats with brims.  - Encourage reapplication of  sunscreen every 2-3 hours during prolonged sun exposure, such as golfing.    Silva angiomas  Cherry angiomas on the back are benign vascular lesions, unchanged in size or appearance.  - Monitor for any changes in size, shape, or color.        Nevi in particular at right ear, 4 mm macule left medial breast 1 cm papule    Benign-appearing nevi, no atypical features on dermoscopy reassurance given monitor.     Waxy tan keratotic papules lesions in areas of concern as noted reassurance given.  Benign nature discussed.  Possibility of cryo, alphahydroxy acids over-the-counter retinol's discussed.     No other susupicious lesions on todays  exam.    Please refer to map for specific lesions.  See additional diagnoses.  Pros cons of various therapies, risks benefits discussed.Pathophysiology, terapeutic options reviewed.  See  Medications in grid.  Instructions reviewed at length.    Benign nevi, seborrheic  keratoses, cherry angiomas:  Reassurance regarding other benign skin lesions.    Monitor for new or changing lesions. Signs and symptoms of skin cancer, ABCDE's of melanoma ( additional information available at AAD.org, skincancer.org) Encourage Sunscreen (broad-spectrum, ideally mineral-based-UVA/UVB -SPF 30 or higher) use encouraged, sun protection/sun protective clothing, self exams reviewed Followup as noted RTC ---routine checkup 6 mos -one year or p.r.n.    Encounter Times   Including precharting, reviewing chart, prior notes obtaining history: 10 minutes, medical exam :10 minutes, notes on body map, plan, counseling 10minutes My total time spent caring for the patient on the day of the encounter: 30 minutes     The patient indicates understanding of these issues and agrees to the plan.  The patient is asked to return as noted in follow-up/ above.    This note was generated using Dragon voice recognition software.  Please contact me regarding any confusion resulting from errors in recognition..  Note to patient  and family: The  [employer] Act makes medical notes like these available to patients. However, be advised this is a medical document. It is intended as [id number] communication and monitoring of a patient's care needs. It is written in medical language and may contain abbreviations or verbiage that are unfamiliar. It may appear blunt or direct. Medical documents are intended to carry relevant information, facts as evident and the clinical opinion of the practitioner.       [1]   Past Medical History:   Cancer (HCC)    cervical age 21    Gestational diabetes (HCC)    Headache   [2]   Past Surgical History:  Procedure Laterality Date    Leep      age 22          Tubal ligation  2019    [place] teeth removed  10/2022   [3]   Family History  Problem Relation Age of Onset    Cancer Self         cervical age 21    Thyroid disease Mother         thyroid removed    Diabetes Father     Arthritis Father     Skin cancer Maternal Uncle     Breast Cancer Neg     Ovarian Cancer Neg     Pancreatic Cancer Neg    [4]   Social History  Socioeconomic History    Marital status:    Tobacco Use    Smoking status: Never    Smokeless tobacco: Never   Vaping Use    Vaping status: Never Used   Substance and Sexual Activity    Alcohol use: Yes     Alcohol/week: 3.0 standard drinks of alcohol     Types: 3 Glasses of wine per week     Comment: 2 times a week    Drug use: Never    Sexual activity: Yes     Partners: Male   Other Topics Concern    History of tanning Yes    Reaction to local anesthetic No    Pt has a pacemaker No    Pt has a defibrillator No   Social History Narrative    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      Social Drivers of Health     Food Insecurity: No Food Insecurity (2025)    NCSS - Food Insecurity     Worried About Running Out of Food in the Last Year: No     Ran Out of Food in the Last Year: No   Transportation Needs: No Transportation Needs (2025)    NCSS - Transportation      Lack of Transportation: No   Housing Stability: Not At Risk (2025)    NCSS - Housing/Utilities     Has Housing: Yes     Worried About Losing Housing: No     Unable to Get Utilities: No   [5]   No current outpatient medications on file.   [6]   Allergies  Allergen Reactions    Penicillins DIARRHEA, DIZZINESS, HIVES, NAUSEA ONLY, NAUSEA AND VOMITING, PALPITATIONS, RASH, SHORTNESS OF BREATH, Tightness in Throat and WHEEZING   [7]   Past Medical History:   Cancer (HCC)    cervical age 21    Gestational diabetes (HCC)    Headache   [8]   Past Surgical History:  Procedure Laterality Date    Leep      age 22          Tubal ligation  2019    [place] teeth removed  10/2022   [9]   Family History  Problem Relation Age of Onset    Cancer Self         cervical age 20    Thyroid disease Mother         thyroid removed    Diabetes Father     Arthritis Father     Skin cancer Maternal Uncle     Breast Cancer Neg     Ovarian Cancer Neg     Pancreatic Cancer Neg

## 2025-07-14 NOTE — PATIENT INSTRUCTIONS
Sun Protection  Wear protective clothing. This includes wearing a broad rimmed hat, long sleeves, high collars, and tightly woven clothes. Specific clothing lines are available for people who need more complete sun avoidance. Use a broad spectrum sunblock (SPF 30 or higher when above options are not possible, or for areas not covered by clothing.    -Avoid being outside when the ultraviolet B (burning) wavelengths from the sun are most intense (10 AM to 3 PM) during non-winter seasons. [place].    -Ultraviolet A wavelengths from the sun are present year round from destiny to dusk. They pass through window glass. This type of ultraviolet is the form seen in tanning beds. It will accentuate sunburns and will produce most of the sunlight -induced aging changes in our skin because it penetrates much deeper into the skin than do the ultraviolet B wavelengths. It is also associated with an increased chance of developing melanoma, potentially the most serious type of skin cancer.    -Broad spectrum sunblocks:  Mexoryl is a sunblock ingredient that is superior to other UVA protection agents. It is found in [employer] (available at [employer] or [employer]). Sunblocks containing microfine zinc oxide (Z-john) or avobenzone (Parsol 1789) provide good UVA protection too.  Any sunscreen with the [employer] Seal is a recommended sunscreen.     -Use SPF 30 or greater an apply liberally. It takes 2 tablespoons or one shot glass to cover the average man in a swim suit. The SPF number provides information about the relative amount of protection provided by a product. Because most people use sunblock sparingly, they may get only 1/2 to 1/3 the SPF coverage indicated on the label. SPF may be sufficient in theory, but in real life it is best to use a higher sunblock, hats, SPF clothing and swimwear, and shade.    -Application technique: Apply sunscreens and sunblocks 20-30 minutes before start off sun  exposure and reapply 20 minutes after exposure begins. Studies suggest this provides improved protection. Reapply sunscreens and sunblocks after 2 hours of use or after 80 minutes if swimming or sweating. Sunscreen effectiveness diminishes after 2 hours unless properly reapplied.     Checking for Skin Cancer  You can find cancer early by checking your skin each month. There are 3 common kinds of skin cancer. They are melanoma, basal cell carcinoma, and squamous cell carcinoma. Doing monthly skin checks is the best way to find new marks or skin changes. Follow the instructions below for checking your skin.  The ABCDEs of checking moles for melanoma  Check your moles or growths for signs of melanoma using ABCDE:  Asymmetry: the sides of the mole or growth don’t match  Border: the edges are ragged, notched, or blurred  Color: the color within the mole or growth varies  Diameter: the mole or growth is larger than 6 mm (size of a pencil eraser)  Evolving: the size, shape, or color of the mole or growth is changing    Checking for other types of skin cancer  Basal cell carcinoma or squamous cell carcinoma have symptoms such as:  A spot or mole that looks different from all other marks on your skin  Changes in how an area feels, such as itching, tenderness, or pain  Changes in the skin's surface, such as oozing, bleeding, or scaliness  A sore that does not heal  New swelling or redness beyond the border of a mole  Who’s at risk?  Anyone can get skin cancer. But you are at greater risk if you have:  Fair skin, light-colored hair, or light-colored eyes  Many moles or abnormal moles on your skin  A history of sunburns from sunlight or tanning beds  A family history of skin cancer  A history of exposure to radiation or chemicals  A weakened immune system  If you have had skin cancer in the past, you are at risk for recurring skin cancer.  How to check your skin  Do your monthly skin checkups in front of a full-length mirror.  Check all parts of your body, including your:  Head (ears, face, neck, and scalp)  Torso (front, back, and sides)  Arms (tops, undersides, upper, and lower armpits)  Hands (palms, backs, and fingers, including under the nails)  Buttocks and genitals  Legs (front, back, and sides)  Feet (tops, soles, toes, including under the nails, and between toes)  If you have a lot of moles, take digital photos of them each month. Make sure to take photos both up close and from a distance. These can help you see if any moles change over time.  Most skin changes are not cancer. But if you see any changes in your skin, call your doctor right away. Only he or she can diagnose a problem. If you have skin cancer, seeing your doctor can be the first step toward getting the treatment that could save your life.  Zadego last reviewed this educational content on 4/1/2019 © 2000-2021 [employer]. All rights reserved. This information is not intended as a substitute for professional medical care. Always follow your healthcare professional's instructions.    Preventing Skin Cancer     Use sunscreen of SPF 30 or greater. Apply liberally.   Relaxing in the sun may feel good. But it isn’t good for your skin. In fact, the sun’s harmful rays are the major cause of skin cancer. This is a serious disease that can be life-threatening. People of all ages, races, and backgrounds are at risk.  Skin cancer is the most common cancer in the [place] But in most cases, it can be prevented.  Your role in prevention  You can act today to help prevent skin cancer. Start by avoiding the sun’s UV (ultraviolet) rays. And don’t use tanning beds or lamps. They are no safer than the sun. Taking these steps can help keep you from getting skin cancer. It can also help prevent wrinkles and other aging effects caused by the sun. Make sure your children also follow these safeguards. Now is the time to start taking steps to prevent skin cancer.  When you are  outdoors  Protect your skin when you go out during the day. Take safety steps whenever you go out to eat, run errands by car or on foot, or do any outdoor activity. There isn’t just one easy way to protect your skin. It’s best to follow all of these steps:  Wear tightly woven clothing that covers your skin. Put on a wide-brimmed hat to protect your face, ears, and scalp.  Watch the clock. Try to stay out of the sun between 10 a.m. and 4 p.m. That's when the sun's rays are strongest.  Head for the shade or create your own. Use an umbrella when sitting or strolling.  Know that the sun’s rays can reflect off sand, water, and snow. This can harm your skin. Take extra care when you are near reflective surfaces.  Keep in mind that even when the weather is hazy or cloudy, your skin can be exposed to strong UV rays.  Shield your skin with sunscreen. Also use sunscreen on your children’s skin. Keep babies younger than 6 months old out of the sun.  Tips for using sunscreen  To help prevent skin cancer, choose the right sunscreen and use it correctly. Try these tips:  Choose a sunscreen that has an SPF (sun protection factor) of at least 30. Also choose a sunscreen labeled \"broad spectrum.” This will protect you from both UVA and UVB (ultraviolet A and B) rays.  If one brand irritates your skin, try another, such as one without fragrance.  Use a water-resistant sunscreen if you swim or sweat.  Use at least 1 ounce of sunscreen to cover exposed areas. This is enough to fill a shot glass. You might need to adjust the amount depending on your body size.  Put the sunscreen on dry skin about 15 minutes before going outdoors. This gives it time to soak in.  Reapply sunscreen every 2 hours. If you’re active, do this more often.  Cover any sun-exposed skin, from your face to your feet. Don’t forget your scalp, ears, and lips.  Know that while sunscreen helps protect you, it isn’t enough. Sunscreens extend the length of time you can be  outdoors before your skin starts to get red. But they don't give you total protection. Using sunscreen doesn't mean you can stay out in the sun for an unlimited time. Your skin cells are still being damaged. You should also wear protective clothing. And try to stay out of the sun as much as you can, especially from 10 a.m. to 4 p.m.  Azael last reviewed this educational content on 7/1/2019 © 2000-2021 [employer]. All rights reserved. This information is not intended as a substitute for professional medical care. Always follow your healthcare professional's instructions.    Common Types of Skin Cancer  Skin cancer is a serious disease that can affect anyone at any age. It's the most common kind of cancer in the [place] If found early, when it's small and hasn't spread, skin cancer can often be treated with success. But in some cases, it's life-threatening.  Talk with your healthcare provider about what you can do to help prevent skin cancer. Ask about regular skin exams as part of your routine physicals. You can also ask about doing monthly self-exams of your skin. If you see any changes in your skin, call your healthcare provider right away.   Understanding the skin  The skin is made up of 3 layers: epidermis, dermis, and hypodermis or fat layer. The epidermis is the thin outer layer of the skin. It consists of 3 types of cells: squamous cells, basal cells, and melanocytes. The squamous cells are flat cells in the outermost layer and are continuously shed. The basal cells are round cells found just beneath the squamous cells at the base of the epidermis. The melanocytes are found in every layer of the epidermis and make melanin. This give the skin its color. The dermis layer is the middle layer of skin and consists of blood and lymph vessels, hair follicles, oil and sweat glands, nerves and collagen. This layer give skin flexibility and strength. The fat layer is the deepest layer consisting of fat  and collagen. It helps provide the body's heat and protects the body from injury.  Skin cancer is a type of cancer that grows in the epidermal layer of the skin. It can form in the basal cells, squamous cells, or melanocytes. Skin cancer can be grouped into 2 types: non-melanoma and melanoma. The 2 most common types of non-melanoma skin cancer are basal cell carcinoma and squamous cell carcinoma.  Basal cell cancer  Basal cell cancer is the most common skin cancer. It starts in basal cells in the deepest part of the epidermis. It's usually found on the face, ears, neck, trunk, or arms, but it can start anywhere. Varying in color, these lesions may be waxy, pearly, scaly, or scar-like. Tiny blood vessels can sometimes be seen through the lesion’s surface. These lesions can bleed easily and might not heal well. Nearly all basal cell cancers can be treated and cured.  Squamous cell cancer  Squamous cell cancer is another type of skin cancer. It starts in flat cells called squamous cells in the upper part of the epidermis. Lesions often form on the face, ears, neck, hands, or arms. They can start in scars and sores that don't heal. The lesions are firm, red bumps or flat, scaly, crusty growths. Squamous cell carcinoma is more likely to grow and spread to other parts of the body than basal cell carcinoma, although this is still uncommon. Most squamous cell carcinoma is found early enough to be treated and cured.  Melanoma  Melanoma is a less common, but much more dangerous kind of skin cancer. It starts in skin cells called melanocytes. It's more likely to grow and spread than basal or squamous cell cancers. It's often not easy to tell where a melanoma lesion’s borders are. It's often brown or black, but it may be mixed in color. The shape and size of melanoma lesions tend to differ from one side to the other. Melanoma can start anywhere, like the genitals, mouth, palms of hands, bottoms of feet, and under the  nails.  There are other types of skin cancer, too. These include Merkel cell cancer, Kaposi sarcoma, and skin (cutaneous) lymphomas, but these cancers are quite rare.  Actinic keratosis  Actinic keratosis is not skin cancer. It's a common, pre-cancer skin change that can turn into a squamous cell skin cancer over time if left untreated. Actinic keratosis lesions tend to appear on sun-exposed parts of the body. They can be pink, reddish-brown, or skin-colored. These lesions are most often small, raised, scaly, and rough, like sandpaper. In some cases, actinic keratosis lesions hurt. Most people with them have more than one lesion. Getting early treatment for actinic keratoses almost always cures the lesions.  Azael last reviewed this educational content on 6/1/2019  © 0426-4096 [employer]. All rights reserved. This information is not intended as a substitute for professional medical care. Always follow your healthcare professional's instructions.

## 2025-08-06 ENCOUNTER — NURSE TRIAGE (OUTPATIENT)
Dept: FAMILY MEDICINE CLINIC | Facility: CLINIC | Age: 45
End: 2025-08-06

## 2025-08-06 ENCOUNTER — OFFICE VISIT (OUTPATIENT)
Dept: OTOLARYNGOLOGY | Facility: CLINIC | Age: 45
End: 2025-08-06

## 2025-08-06 ENCOUNTER — PATIENT MESSAGE (OUTPATIENT)
Dept: FAMILY MEDICINE CLINIC | Facility: CLINIC | Age: 45
End: 2025-08-06

## 2025-08-06 DIAGNOSIS — R68.84 JAW PAIN: Primary | ICD-10-CM

## 2025-08-06 DIAGNOSIS — R25.2 TRISMUS: ICD-10-CM

## 2025-08-06 DIAGNOSIS — M26.609 TMJ (TEMPOROMANDIBULAR JOINT DISORDER): Primary | ICD-10-CM

## 2025-08-06 PROCEDURE — 99204 OFFICE O/P NEW MOD 45 MIN: CPT | Performed by: STUDENT IN AN ORGANIZED HEALTH CARE EDUCATION/TRAINING PROGRAM

## 2025-08-06 PROCEDURE — G2211 COMPLEX E/M VISIT ADD ON: HCPCS | Performed by: STUDENT IN AN ORGANIZED HEALTH CARE EDUCATION/TRAINING PROGRAM

## 2025-08-06 RX ORDER — CELECOXIB 200 MG/1
200 CAPSULE ORAL 2 TIMES DAILY
Qty: 28 CAPSULE | Refills: 0 | Status: SHIPPED | OUTPATIENT
Start: 2025-08-06 | End: 2025-08-20

## 2025-08-06 RX ORDER — CYCLOBENZAPRINE HCL 5 MG
5 TABLET ORAL NIGHTLY
Qty: 14 TABLET | Refills: 0 | Status: SHIPPED | OUTPATIENT
Start: 2025-08-06 | End: 2025-08-20

## 2025-08-06 RX ORDER — METHYLPREDNISOLONE 4 MG/1
TABLET ORAL
Qty: 21 TABLET | Refills: 0 | Status: SHIPPED | OUTPATIENT
Start: 2025-08-06